# Patient Record
Sex: FEMALE | Race: WHITE
[De-identification: names, ages, dates, MRNs, and addresses within clinical notes are randomized per-mention and may not be internally consistent; named-entity substitution may affect disease eponyms.]

---

## 2019-03-08 ENCOUNTER — HOSPITAL ENCOUNTER (INPATIENT)
Dept: HOSPITAL 5 - ED | Age: 29
LOS: 1 days | Discharge: HOME | DRG: 871 | End: 2019-03-09
Attending: INTERNAL MEDICINE | Admitting: INTERNAL MEDICINE
Payer: SELF-PAY

## 2019-03-08 DIAGNOSIS — J18.1: ICD-10-CM

## 2019-03-08 DIAGNOSIS — A41.9: Primary | ICD-10-CM

## 2019-03-08 LAB
BASOPHILS # (AUTO): 0 K/MM3 (ref 0–0.1)
BASOPHILS NFR BLD AUTO: 0.2 % (ref 0–1.8)
BILIRUB UR QL STRIP: (no result)
BLOOD UR QL VISUAL: (no result)
BUN SERPL-MCNC: 7 MG/DL (ref 7–17)
BUN/CREAT SERPL: 12 %
CALCIUM SERPL-MCNC: 9.1 MG/DL (ref 8.4–10.2)
EOSINOPHIL # BLD AUTO: 0.1 K/MM3 (ref 0–0.4)
EOSINOPHIL NFR BLD AUTO: 0.3 % (ref 0–4.3)
HCT VFR BLD CALC: 39.9 % (ref 30.3–42.9)
HEMOLYSIS INDEX: 8
HGB BLD-MCNC: 13.6 GM/DL (ref 10.1–14.3)
INR PPP: 1.05 (ref 0.87–1.13)
LYMPHOCYTES # BLD AUTO: 0.8 K/MM3 (ref 1.2–5.4)
LYMPHOCYTES NFR BLD AUTO: 4.3 % (ref 13.4–35)
MCHC RBC AUTO-ENTMCNC: 34 % (ref 30–34)
MCV RBC AUTO: 96 FL (ref 79–97)
MONOCYTES # (AUTO): 1.1 K/MM3 (ref 0–0.8)
MONOCYTES % (AUTO): 6 % (ref 0–7.3)
PH UR STRIP: 7 [PH] (ref 5–7)
PLATELET # BLD: 254 K/MM3 (ref 140–440)
PROT UR STRIP-MCNC: (no result) MG/DL
RBC # BLD AUTO: 4.14 M/MM3 (ref 3.65–5.03)
RBC #/AREA URNS HPF: 4 /HPF (ref 0–6)
UROBILINOGEN UR-MCNC: < 2 MG/DL (ref ?–2)
WBC #/AREA URNS HPF: 3 /HPF (ref 0–6)

## 2019-03-08 PROCEDURE — 82550 ASSAY OF CK (CPK): CPT

## 2019-03-08 PROCEDURE — 84703 CHORIONIC GONADOTROPIN ASSAY: CPT

## 2019-03-08 PROCEDURE — 71275 CT ANGIOGRAPHY CHEST: CPT

## 2019-03-08 PROCEDURE — 85025 COMPLETE CBC W/AUTO DIFF WBC: CPT

## 2019-03-08 PROCEDURE — 85610 PROTHROMBIN TIME: CPT

## 2019-03-08 PROCEDURE — 84484 ASSAY OF TROPONIN QUANT: CPT

## 2019-03-08 PROCEDURE — 87806 HIV AG W/HIV1&2 ANTB W/OPTIC: CPT

## 2019-03-08 PROCEDURE — 96361 HYDRATE IV INFUSION ADD-ON: CPT

## 2019-03-08 PROCEDURE — 93010 ELECTROCARDIOGRAM REPORT: CPT

## 2019-03-08 PROCEDURE — 82805 BLOOD GASES W/O2 SATURATION: CPT

## 2019-03-08 PROCEDURE — 87040 BLOOD CULTURE FOR BACTERIA: CPT

## 2019-03-08 PROCEDURE — 80048 BASIC METABOLIC PNL TOTAL CA: CPT

## 2019-03-08 PROCEDURE — 87400 INFLUENZA A/B EACH AG IA: CPT

## 2019-03-08 PROCEDURE — 96375 TX/PRO/DX INJ NEW DRUG ADDON: CPT

## 2019-03-08 PROCEDURE — 71046 X-RAY EXAM CHEST 2 VIEWS: CPT

## 2019-03-08 PROCEDURE — 36415 COLL VENOUS BLD VENIPUNCTURE: CPT

## 2019-03-08 PROCEDURE — 96374 THER/PROPH/DIAG INJ IV PUSH: CPT

## 2019-03-08 PROCEDURE — 81001 URINALYSIS AUTO W/SCOPE: CPT

## 2019-03-08 PROCEDURE — 93005 ELECTROCARDIOGRAM TRACING: CPT

## 2019-03-08 PROCEDURE — 87086 URINE CULTURE/COLONY COUNT: CPT

## 2019-03-08 PROCEDURE — 82140 ASSAY OF AMMONIA: CPT

## 2019-03-08 RX ADMIN — Medication SCH ML: at 23:21

## 2019-03-08 RX ADMIN — SODIUM CHLORIDE SCH MLS/HR: 0.45 INJECTION, SOLUTION INTRAVENOUS at 16:01

## 2019-03-08 NOTE — CAT SCAN REPORT
PROCEDURE: CT ANGIO CHEST 

 

TECHNIQUE: Computerized tomographic angiography of the chest was performed after the IV injection of 
iodinated nonionic contrast including image processing.  The image data was postprocessed using 2-dim
ensional multiplanar reformatted (MPR) and 3-dimensional (MIP and/or volume rendered) techniques. Aut
omated exposure control, adjustment of mA and/or kV according to patient size, or iterative reconstru
ction dose optimization techniques were utilized. Coronal and sagittal reconstructed imaging provided
. 

 

CT DOSE LENGTH PRODUCT:  350.32 mGy-cm. 

 

HISTORY: pleurisy, shortness of breath 

 

COMPARISONS: None currently available. 

 

FINDINGS: 

Moderate irregular consolidation in the medial left lower lobe. No pneumothorax. No effusion. Right l
patrice is clear. No endobronchial lesions. 

 

Main pulmonary artery is unremarkable. No pulmonary embolism. 

No aneurysm. No dissection. Major branch arteries are within normal limits. No significant atheroscle
rotic disease. 

Cardiac silhouette is within normal limits. No pericardial effusion. No obvious coronary artery disea
se. 

 

There is no axillary adenopathy.  

There is no hilar or mediastinal mass or adenopathy.  

Limited images of the thyroid gland are unremarkable. 

Limited images of the esophagus are unremarkable. 

 

Bones: 

No suspicious osseous lesions on this limited examination of the skeleton. Metastatic disease better 
evaluated with bone scan.  

 

IMPRESSION: 

*  Left lower lobe pneumonia. 

*  No pulmonary embolus. No aortic aneurysm. No dissection. 

 

This document is electronically signed by Devaughn Kennedy MD., March 8 2019 11:05:18 AM ET

## 2019-03-08 NOTE — XRAY REPORT
PROCEDURE: XR CHEST ROUTINE 2V 

 

TECHNIQUE:  PA and lateral chest radiographs 

 

HISTORY: Chest pain 

 

COMPARISONS: None  

 

FINDINGS: 

 

No mediastinal shift.  Cardiac silhouette is not enlarged.  No pneumothorax, effusion, or focal pulmo
nary opacity. No acute skeletal finding. 

 

IMPRESSION: 

 

No focal pulmonary opacity. 

  

 

This document is electronically signed by Trevor Rodriguez MD., March 8 2019 07:17:41 AM ET

## 2019-03-08 NOTE — EMERGENCY DEPARTMENT REPORT
HPI





- General


Chief Complaint: Chest Pain


Time Seen by Provider: 19 07:56





- HPI


HPI: 





Room 19





The patient is a 28-year-old female presenting with a chief complaint of chest 

pain shortness of breath.  The patient has had a cough has been productive since

yesterday.  This evening at midnight the patient felt left-sided chest pain, 

pleurisy and shortness of breath.  Patient had a subjective fever.  Patient 

describes her chest pain is sharp and constant in nature.  The patient currently

gives her pain a score of 8/10








Location: Chest, lungs


Duration: [See above]


Quality: Sharp


Severity:8/10


Modifying factors: [see above]


Context: [see above]


Mode of transportation: [not driving]





ED Past Medical Hx





- Past Medical History


Previous Medical History?: No





- Surgical History


Past Surgical History?: No





- Family History


Family history: no significant





- Social History


Smoking Status: Never Smoker


Substance Use Type: None (denies illicit drug use), Alcohol (occ)





- Medications


Home Medications: 


                                Home Medications











 Medication  Instructions  Recorded  Confirmed  Last Taken  Type


 


Ibuprofen [Motrin 600 MG tab] 600 mg PO Q6HR #30 tablet 12/28/15  Unknown Rx


 


oxyCODONE /ACETAMINOPHEN [Percocet 1 tab PO Q4H PRN #30 tablet 12/28/15  Unknown

 Rx





5/325 mg]     














ED Review of Systems


ROS: 


Stated complaint: CHEST PAIN FEVER


Other details as noted in HPI





Constitutional: fever


Eyes: denies: eye pain


ENT: denies: throat pain


Respiratory: cough, shortness of breath


Cardiovascular: chest pain


Endocrine: no symptoms reported


Gastrointestinal: nausea.  denies: vomiting


Genitourinary: denies: dysuria


Musculoskeletal: myalgia


Neurological: denies: headache





Physical Exam





- Physical Exam


Vital Signs: 


                                   Vital Signs











  19





  05:26 05:55 06:40


 


Temperature 98.7 F  99.2 F


 


Pulse Rate 118 H  120 H


 


Respiratory 20 20 20





Rate   


 


Blood Pressure 113/70  





[Left]   


 


O2 Sat by Pulse 99 99 97





Oximetry   














  19





  07:02


 


Temperature 


 


Pulse Rate 


 


Respiratory 





Rate 


 


Blood Pressure 106/59





[Left] 


 


O2 Sat by Pulse 





Oximetry 











Physical Exam: 





GENERAL: The patient is well-developed well-nourished female lying on stretcher 

not appearing to be in acute distress. []


HEENT: Normocephalic.  Atraumatic.  Extraocular motions are intact.  Patient has

 moist mucous membranes.


NECK: Supple.  Trachea midline


CHEST/LUNGS: Clear to auscultation.  There is no respiratory distress noted.


HEART/CARDIOVASCULAR: Regular.  There is tachycardia.  There is no gallop rub or

 murmur.


ABDOMEN: Abdomen is soft, nontender.  Patient has normal bowel sounds.  There is

 no abdominal distention.


SKIN: There is no rash.  There is no edema.  There is no diaphoresis.


NEURO: The patient is awake, alert, and oriented.  The patient is cooperative. 

The patient has normal speech


MUSCULOSKELETAL: There is no evidence of acute injury.





ED Course


                                   Vital Signs











  19





  05:26 05:55 06:40


 


Temperature 98.7 F  99.2 F


 


Pulse Rate 118 H  120 H


 


Respiratory 20 20 20





Rate   


 


Blood Pressure 113/70  





[Left]   


 


O2 Sat by Pulse 99 99 97





Oximetry   














  19





  07:02


 


Temperature 


 


Pulse Rate 


 


Respiratory 





Rate 


 


Blood Pressure 106/59





[Left] 


 


O2 Sat by Pulse 





Oximetry 














- Reevaluation(s)


Reevaluation #1: 





19 13:22


Patient remains tachycardic despite 3 L normal saline.  Will admit to the 

hospital





ED Medical Decision Making





- Lab Data


Result diagrams: 


                                 19 05:44





                                 19 05:44





                                Laboratory Tests











  19





  05:44 05:44 05:44


 


WBC  18.5 H  


 


RBC  4.14  


 


Hgb  13.6  


 


Hct  39.9  


 


MCV  96  


 


MCH  33 H  


 


MCHC  34  


 


RDW  13.2  


 


Plt Count  254  


 


Lymph % (Auto)  4.3 L  


 


Mono % (Auto)  6.0  


 


Eos % (Auto)  0.3  


 


Baso % (Auto)  0.2  


 


Lymph #  0.8 L  


 


Mono #  1.1 H  


 


Eos #  0.1  


 


Baso #  0.0  


 


Seg Neutrophils %  89.2 H  


 


Seg Neutrophils #  16.4 H  


 


PT   


 


INR   


 


VBG pH   


 


Sodium   139 


 


Potassium   4.1 


 


Chloride   103.6 


 


Carbon Dioxide   22 


 


Anion Gap   18 


 


BUN   7 


 


Creatinine   0.6 L 


 


Estimated GFR   > 60 


 


BUN/Creatinine Ratio   12 


 


Glucose   116 H 


 


Lactic Acid   


 


Calcium   9.1 


 


Total Creatine Kinase   


 


Troponin T   < 0.010 


 


HCG, Qual    Negative


 


Urine Color   


 


Urine Turbidity   


 


Urine pH   


 


Ur Specific Gravity   


 


Urine Protein   


 


Urine Glucose (UA)   


 


Urine Ketones   


 


Urine Blood   


 


Urine Nitrite   


 


Urine Bilirubin   


 


Urine Urobilinogen   


 


Ur Leukocyte Esterase   


 


Urine WBC (Auto)   


 


Urine RBC (Auto)   


 


U Epithel Cells (Auto)   


 


Influenza A (Rapid)   


 


Influenza B (Rapid)   














  19





  05:44 06:45 06:45


 


WBC   


 


RBC   


 


Hgb   


 


Hct   


 


MCV   


 


MCH   


 


MCHC   


 


RDW   


 


Plt Count   


 


Lymph % (Auto)   


 


Mono % (Auto)   


 


Eos % (Auto)   


 


Baso % (Auto)   


 


Lymph #   


 


Mono #   


 


Eos #   


 


Baso #   


 


Seg Neutrophils %   


 


Seg Neutrophils #   


 


PT   14.4 


 


INR   1.05 


 


VBG pH   


 


Sodium   


 


Potassium   


 


Chloride   


 


Carbon Dioxide   


 


Anion Gap   


 


BUN   


 


Creatinine   


 


Estimated GFR   


 


BUN/Creatinine Ratio   


 


Glucose   


 


Lactic Acid    1.10


 


Calcium   


 


Total Creatine Kinase  77  


 


Troponin T   


 


HCG, Qual   


 


Urine Color   


 


Urine Turbidity   


 


Urine pH   


 


Ur Specific Gravity   


 


Urine Protein   


 


Urine Glucose (UA)   


 


Urine Ketones   


 


Urine Blood   


 


Urine Nitrite   


 


Urine Bilirubin   


 


Urine Urobilinogen   


 


Ur Leukocyte Esterase   


 


Urine WBC (Auto)   


 


Urine RBC (Auto)   


 


U Epithel Cells (Auto)   


 


Influenza A (Rapid)   


 


Influenza B (Rapid)   














  19





  06:45 08:23 08:29


 


WBC   


 


RBC   


 


Hgb   


 


Hct   


 


MCV   


 


MCH   


 


MCHC   


 


RDW   


 


Plt Count   


 


Lymph % (Auto)   


 


Mono % (Auto)   


 


Eos % (Auto)   


 


Baso % (Auto)   


 


Lymph #   


 


Mono #   


 


Eos #   


 


Baso #   


 


Seg Neutrophils %   


 


Seg Neutrophils #   


 


PT   


 


INR   


 


VBG pH  7.369  


 


Sodium   


 


Potassium   


 


Chloride   


 


Carbon Dioxide   


 


Anion Gap   


 


BUN   


 


Creatinine   


 


Estimated GFR   


 


BUN/Creatinine Ratio   


 


Glucose   


 


Lactic Acid   


 


Calcium   


 


Total Creatine Kinase   


 


Troponin T    < 0.010


 


HCG, Qual   


 


Urine Color   


 


Urine Turbidity   


 


Urine pH   


 


Ur Specific Gravity   


 


Urine Protein   


 


Urine Glucose (UA)   


 


Urine Ketones   


 


Urine Blood   


 


Urine Nitrite   


 


Urine Bilirubin   


 


Urine Urobilinogen   


 


Ur Leukocyte Esterase   


 


Urine WBC (Auto)   


 


Urine RBC (Auto)   


 


U Epithel Cells (Auto)   


 


Influenza A (Rapid)   Negative 


 


Influenza B (Rapid)   Negative 














  19





  09:50 11:35 Unknown


 


WBC   


 


RBC   


 


Hgb   


 


Hct   


 


MCV   


 


MCH   


 


MCHC   


 


RDW   


 


Plt Count   


 


Lymph % (Auto)   


 


Mono % (Auto)   


 


Eos % (Auto)   


 


Baso % (Auto)   


 


Lymph #   


 


Mono #   


 


Eos #   


 


Baso #   


 


Seg Neutrophils %   


 


Seg Neutrophils #   


 


PT   


 


INR   


 


VBG pH   


 


Sodium   


 


Potassium   


 


Chloride   


 


Carbon Dioxide   


 


Anion Gap   


 


BUN   


 


Creatinine   


 


Estimated GFR   


 


BUN/Creatinine Ratio   


 


Glucose   


 


Lactic Acid  0.70  


 


Calcium   


 


Total Creatine Kinase   


 


Troponin T   < 0.010 


 


HCG, Qual   


 


Urine Color    Straw


 


Urine Turbidity    Clear


 


Urine pH    7.0


 


Ur Specific Gravity    1.004


 


Urine Protein    <15 mg/dl


 


Urine Glucose (UA)    Neg


 


Urine Ketones    Neg


 


Urine Blood    Lg


 


Urine Nitrite    Neg


 


Urine Bilirubin    Neg


 


Urine Urobilinogen    < 2.0


 


Ur Leukocyte Esterase    Neg


 


Urine WBC (Auto)    3.0


 


Urine RBC (Auto)    4.0


 


U Epithel Cells (Auto)    < 1.0


 


Influenza A (Rapid)   


 


Influenza B (Rapid)   














- EKG Data


-: EKG Interpreted by Me


EKG shows normal: sinus rhythm


Rate: tachycardia





- EKG Data


When compared to previous EKG there are: previous EKG unavailable


Interpretation: nonspecific ST-T wave darius (T-wave inversion in lead V3, V4)





- Radiology Data


Radiology results: report reviewed (chest x-ray, CT chest), image reviewed 

(chest x-ray, CT chest)


interpreted by me: 





Chest x-ray-no focal infiltrates, no pneumothorax





[address] [address] 





XRay Report 


Signed 





Patient: VALDES,GRISELDA ELIZABETH M 


R#: [id number] 


: 1990 Acct:[id number] 





Age/Sex: 28 / F ADM Date: 19 





Loc: ED 


Attending Dr: 








Ordering Physician: RUDI PERERA MD 


Date of Service: 19 


Procedure(s): XR chest routine 2V 


Accession Number(s): [id number] 





cc: ED MD TREVER 





Fluoro Time In Minutes: 





PROCEDURE: XR CHEST ROUTINE 2V 





TECHNIQUE: PA and lateral chest radiographs 





HISTORY: Chest pain 





COMPARISONS: None 





FINDINGS: 





No mediastinal shift. Cardiac silhouette is not enlarged. No pneumothorax, 

effusion, or focal 


pulmonary opacity. No acute skeletal finding. 





IMPRESSION: 





No focal pulmonary opacity. 








This document is electronically signed by Trevor Rogers MD., 2019 

07:17:41 AM ET 





Transcribed By: MB 


Dictated By: TREVOR ROGERS MD 


Electronically Authenticated By: TREVOR ROGERS MD 


Signed Date/Time: 19 











DD/DT: 1942 


TD/TT: 1902





[address] [address] 





Cat Scan Report 


Signed 





Patient: VALDES,GRISELDA ELIZABETH M 


R#: [id number] 


: 1990 Acct:[id number] 





Age/Sex: 28 / F ADM Date: 19 





Loc: ED 


Attending Dr: 








Ordering Physician: ADAM SEWELL MD 


Date of Service: 19 


Procedure(s): CT angio chest 


Accession Number(s): [id number] 





cc: ADAM SEWELL MD 








PROCEDURE: CT ANGIO CHEST 





TECHNIQUE: Computerized tomographic angiography of the chest was performed after

 the IV injection 


of iodinated nonionic contrast including image processing. The image data was 

postprocessed using 


2-dimensional multiplanar reformatted (MPR) and 3-dimensional (MIP and/or volume

 rendered) 


techniques. Automated exposure control, adjustment of mA and/or kV according to 

patient size, or 


iterative reconstruction dose optimization techniques were utilized. Coronal and

 sagittal 


reconstructed imaging provided. 





CT DOSE LENGTH PRODUCT: 350.32 mGy-cm. 





HISTORY: pleurisy, shortness of breath 





COMPARISONS: None currently available. 





FINDINGS: 


Moderate irregular consolidation in the medial left lower lobe. No pneumothorax.

 No effusion. Right


lung is clear. No endobronchial lesions. 





Main pulmonary artery is unremarkable. No pulmonary embolism. 


No aneurysm. No dissection. Major branch arteries are within normal limits. No 

significant 


atherosclerotic disease. 


Cardiac silhouette is within normal limits. No pericardial effusion. No obvious 

coronary artery 


disease. 





There is no axillary adenopathy. 


There is no hilar or mediastinal mass or adenopathy. 


Limited images of the thyroid gland are unremarkable. 


Limited images of the esophagus are unremarkable. 





Bones: 


No suspicious osseous lesions on this limited examination of the skeleton. 

Metastatic disease 


better evaluated with bone scan. 





IMPRESSION: 


* Left lower lobe pneumonia. 


* No pulmonary embolus. No aortic aneurysm. No dissection. 





This document is electronically signed by Devaughn Gilliland MD., 2019 11:05:18

 AM ET 





Transcribed By: TYM 


Dictated By: DEVAUGHN GILLILAND MD 


Electronically Authenticated By: DEVAUGHN GILLILAND MD 


Signed Date/Time: 19 1107 











DD/DT: 19 


TD/TT: 19





- Differential Diagnosis


pneumonia, influenza, bronchitis, pleurisy, PE


Critical care attestation.: 


If time is entered above; I have spent that time in minutes in the direct care 

of this critically ill patient, excluding procedure time.








ED Disposition


Clinical Impression: 


 Pneumonia, Shortness of breath, Tachycardia, Leukocytosis





Disposition: DC-09 OP ADMIT IP TO THIS HOSP


Is pt being admited?: Yes


Does the pt Need Aspirin: Yes


Condition: Fair


Instructions:  Bacterial Pneumonia (ED)


Referrals: 


[facility] - [facility]-5 Days


Time of Disposition: 13:23 (hospitalist notified (Dr Appiah))

## 2019-03-08 NOTE — HISTORY AND PHYSICAL REPORT
History of Present Illness


Chief complaint: 





Im sick


History of present illness: 


29 YO Female with No PMH presents to ED for evaluation. Pt states that she has 

experienced productive cough with yellow sputum over the past two days as well 

as shortness of breath with left sided chest discomfort. Chest discomfort 

associated with coughing and shortness of breath. Pt acknowledges fever, and 

feeling weak, and tired. Pt denies palpitations, NVD, Trauma, BRBPR, 

Unintentional weight loss, night sweats, leg swelling, calf pain, prolonged 

travel/immobility, skin rash, or recent ill contacts. Pt transported to [facility] by 

her family for further care. Pt seen and evaluated in ED and found to have LLL 

Pneumonia, Sepsis. Pt admitted to medical floor and initiated on Sepsis and 

Pneumonia protocols. 





Past History


Past Medical History: No medical history (reviewed)


Past Surgical History: No surgical history (reviewed)


Social history: , lives with family


Family history: no significant family history (reviewed)





Medications and Allergies


                                    Allergies











Allergy/AdvReac Type Severity Reaction Status Date / Time


 


Pork/Porcine Containing Allergy  Unknown Verified 12/27/15 18:31





Products     











                                Home Medications











 Medication  Instructions  Recorded  Confirmed  Last Taken  Type


 


No Known Home Medications [No  03/08/19 03/08/19 Unknown History





Reported Home Medications]     











Active Meds: 


Active Medications





Acetaminophen (Tylenol)  650 mg PO Q4H PRN


   PRN Reason: Pain MILD(1-3)/Fever >100.5/HA


Albuterol (Proventil)  2.5 mg IH Q4HRT PRN


   PRN Reason: Shortness Of Breath


Sodium Chloride (Nacl 0.45% 1000 Ml)  1,000 mls @ 100 mls/hr IV AS DIRECT MATTEO


Levofloxacin/Dextrose (Levaquin 750mg/150ml)  750 mg in 150 mls @ 100 mls/hr IV 

Q24HR MATTEO; Protocol


Ibuprofen (Motrin)  600 mg PO Q6H PRN


   PRN Reason: Pain, Mild (1-3)


Ondansetron HCl (Zofran)  4 mg IV Q8H PRN


   PRN Reason: Nausea And Vomiting


Oxycodone/Acetaminophen (Percocet 5/325)  1 tab PO Q4H PRN


   PRN Reason: Moderate Pain


Sodium Chloride (Sodium Chloride Flush Syringe 10 Ml)  10 ml IV BID MATTEO


Sodium Chloride (Sodium Chloride Flush Syringe 10 Ml)  10 ml IV PRN PRN


   PRN Reason: LINE FLUSH











Review of Systems


Constitutional: fever, no weight loss, no weight gain


Ears, nose, mouth and throat: no ear pain, no ear discharge, no tinnitis, no 

nose pain, no nasal congestion


Breasts: no change in shape, no swelling, no mass


Cardiovascular: no chest pain, no orthopnea, no palpitations


Respiratory: cough, cough with sputum, shortness of breath, pleurisy


Gastrointestinal: no abdominal pain, no nausea, no vomiting, no diarrhea


Genitourinary Female: no pelvic pain, no flank pain, no menorrhagia, no dysuria,

no urinary frequency, no urgency


Rectal: no pain, no incontinence, no bleeding


Musculoskeletal: no neck stiffness, no neck pain, no shooting arm pain, no arm 

numbness/tingling, no low back pain, no shooting leg pain


Integumentary: no rash, no pruritis, no redness, no sores, no wounds


Neurological: no transient paralysis, no paralysis, no weakness, no parathesias,

no numbness, no tingling, no seizures


Psychiatric: no anxiety, no memory loss, no change in sleep habits, no sleep 

disturbances, no insomnia, no hypersomnia


Endocrine: no cold intolerance, no heat intolerance, no polyphagia, no excessive

thirst, no polydipsia, no polyuria


Hematologic/Lymphatic: no easy bruising, no easy bleeding


Allergic/Immunologic: no urticaria, no allergic rhinitis, no wheezing





Exam





- Constitutional


Vitals: 


                                        











Temp Pulse Resp BP Pulse Ox


 


 99.1 F   113 H  18   117/81   99 


 


 03/08/19 11:55  03/08/19 13:11  03/08/19 11:55  03/08/19 13:00  03/08/19 13:00











General appearance: Present: mild distress





- EENT


Eyes: Present: PERRL


ENT: hearing intact, clear oral mucosa





- Neck


Neck: Present: supple, normal ROM





- Respiratory


Respiratory effort: normal


Respiratory: left: diminished, rhonchi





- Cardiovascular


Rhythm: other (tachycardia)


Heart Sounds: Present: S1 & S2.  Absent: rub, click





- Extremities


Extremities: pulses symmetrical, No edema


Peripheral Pulses: abnormal (capillary refill greater than 3.5 seconds)





- Abdominal


General gastrointestinal: Present: soft, non-tender, non-distended, normal bowel

sounds


Female genitourinary: Present: normal





- Integumentary


Integumentary: Present: clear, warm, dry





- Musculoskeletal


Musculoskeletal: generalized weakness





- Psychiatric


Psychiatric: appropriate mood/affect, intact judgment & insight





- Neurologic


Neurologic: CNII-XII intact, moves all extremities, no gait normal





Results





- Labs


CBC & Chem 7: 


                                 03/08/19 05:44





                                 03/08/19 05:44


Labs: 


                              Abnormal lab results











  03/08/19 03/08/19 Range/Units





  05:44 05:44 


 


WBC  18.5 H   (4.5-11.0)  K/mm3


 


MCH  33 H   (28-32)  pg


 


Lymph % (Auto)  4.3 L   (13.4-35.0)  %


 


Lymph #  0.8 L   (1.2-5.4)  K/mm3


 


Mono #  1.1 H   (0.0-0.8)  K/mm3


 


Seg Neutrophils %  89.2 H   (40.0-70.0)  %


 


Seg Neutrophils #  16.4 H   (1.8-7.7)  K/mm3


 


Creatinine   0.6 L  (0.7-1.2)  mg/dL


 


Glucose   116 H  ()  mg/dL














Assessment and Plan





- Patient Problems


(1) Sepsis


Current Visit: Yes   Status: Acute   


Qualifiers: 


   Sepsis type: sepsis due to unspecified organism   Qualified Code(s): A41.9 - 

Sepsis, unspecified organism   


Plan to address problem: 


IV antibiotic therapy, IVR resuscitation therapy, serial lactic acid level, 

blood cultures, CBC, CMP, chest x ray, urinalysis, influenza A/B, Rapid HIV








(2) Pneumonia


Current Visit: Yes   Status: Acute   


Qualifiers: 


   Laterality: left   Lung location: lower lobe of lung 


Plan to address problem: 


IV antibiotic therapy, supplemental oxygen, nebulizer therapy, chest x ray, 








(3) DVT prophylaxis


Current Visit: Yes   Status: Acute   


Plan to address problem: 


SCD to BLE while in bed.

## 2019-03-09 VITALS — SYSTOLIC BLOOD PRESSURE: 109 MMHG | DIASTOLIC BLOOD PRESSURE: 75 MMHG

## 2019-03-09 LAB
BASOPHILS # (AUTO): 0 K/MM3 (ref 0–0.1)
BASOPHILS NFR BLD AUTO: 0.3 % (ref 0–1.8)
EOSINOPHIL # BLD AUTO: 0.3 K/MM3 (ref 0–0.4)
EOSINOPHIL NFR BLD AUTO: 2.3 % (ref 0–4.3)
HCT VFR BLD CALC: 33.5 % (ref 30.3–42.9)
HGB BLD-MCNC: 11.3 GM/DL (ref 10.1–14.3)
LYMPHOCYTES # BLD AUTO: 1.9 K/MM3 (ref 1.2–5.4)
LYMPHOCYTES NFR BLD AUTO: 17.2 % (ref 13.4–35)
MCHC RBC AUTO-ENTMCNC: 34 % (ref 30–34)
MCV RBC AUTO: 98 FL (ref 79–97)
MONOCYTES # (AUTO): 1 K/MM3 (ref 0–0.8)
MONOCYTES % (AUTO): 8.6 % (ref 0–7.3)
PLATELET # BLD: 209 K/MM3 (ref 140–440)
RBC # BLD AUTO: 3.43 M/MM3 (ref 3.65–5.03)

## 2019-03-09 RX ADMIN — SODIUM CHLORIDE SCH MLS/HR: 0.45 INJECTION, SOLUTION INTRAVENOUS at 03:47

## 2019-03-09 RX ADMIN — Medication SCH ML: at 09:31

## 2019-03-09 NOTE — DISCHARGE SUMMARY
Providers





- Providers


Date of Admission: 


03/08/19 13:35





Date of discharge: 03/09/19


Attending physician: 


KRISTA JOHNSON





Primary care physician: 


[facility], [place]








Hospitalization


Reason for admission: Cough


Condition: Fair


Pertinent studies: 





CTA chest:


* Left lower lobe pneumonia. * No pulmonary embolus. No aortic aneurysm. No 

dissection. 





CXR - no focal opacity








Hospital course: 


27 YO Female with No PMH presents to ED for evaluation with c/o productive cough

with yellow sputum over the past two days as well as shortness of breath with 

left sided chest discomfort. She also had associated cough and shortness of 

breath, fever, and feeling weak, and tired. Patient transported to [facility] by her 

family for further care. Pt seen and evaluated in ED and found to have LLL 

Pneumonia, Sepsis. Patient was admitted to medical floor and initiated on Sepsis

and Pneumonia protocols. She was initiated on iv abx, remained afebrile, her 

symptom improved, she was then discharged home to complete abx course as outpt 

and to f/u with her PCP.  





Discharge diagnosis:


(1) Sepsis due to PNA


(2) Pneumonia, CAP


(3) Rightsided Chest pain due to PNA


(4) DVT prophylaxis











Disposition: DC-01 TO HOME OR SELFCARE


Time spent for discharge: 34 minutes





Core Measure Documentation





- Palliative Care


Palliative Care/ Comfort Measures: Not Applicable





- Core Measures


Any of the following diagnoses?: none





Exam





- Constitutional


Vitals: 


                                        











Temp Pulse Resp BP Pulse Ox


 


 98.1 F   77   24   97/60   98 


 


 03/09/19 05:13  03/09/19 05:13  03/09/19 05:13  03/09/19 05:13  03/09/19 05:13











General appearance: Present: no acute distress, well-nourished





- EENT


Eyes: Present: PERRL


ENT: hearing intact, clear oral mucosa





- Neck


Neck: Present: supple, normal ROM





- Respiratory


Respiratory effort: normal


Respiratory: bilateral: CTA





- Cardiovascular


Heart Sounds: Present: S1 & S2.  Absent: rub, click





- Extremities


Extremities: pulses symmetrical, No edema


Peripheral Pulses: within normal limits





- Abdominal


General gastrointestinal: Present: soft, non-tender, non-distended, normal bowel

sounds


Female genitourinary: Present: normal





- Integumentary


Integumentary: Present: clear, warm, dry





- Musculoskeletal


Musculoskeletal: gait normal, strength equal bilaterally





- Psychiatric


Psychiatric: appropriate mood/affect, intact judgment & insight





- Neurologic


Neurologic: CNII-XII intact, moves all extremities





Plan


Activity: advance as tolerated


Weight Bearing Status: Weight Bear as Tolerated


Diet: regular


Follow up with: 


[facility] [Primary Care Provider] - 3-5 Days


Forms:  Work/School Release Form


Prescriptions: 


levoFLOXacin [Levaquin] 750 mg PO QDAY #5 tablet

## 2019-07-11 ENCOUNTER — HOSPITAL ENCOUNTER (EMERGENCY)
Dept: HOSPITAL 5 - ED | Age: 29
Discharge: HOME | End: 2019-07-11
Payer: COMMERCIAL

## 2019-07-11 VITALS — SYSTOLIC BLOOD PRESSURE: 107 MMHG | DIASTOLIC BLOOD PRESSURE: 63 MMHG
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
## 2019-07-11 DIAGNOSIS — Z91.018: ICD-10-CM

## 2019-07-11 DIAGNOSIS — R51: ICD-10-CM

## 2019-07-11 DIAGNOSIS — Z79.899: ICD-10-CM

## 2019-07-11 DIAGNOSIS — S39.012A: ICD-10-CM

## 2019-07-11 DIAGNOSIS — S16.1XXA: Primary | ICD-10-CM
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
## 2019-07-11 PROCEDURE — 99284 EMERGENCY DEPT VISIT MOD MDM: CPT

## 2019-07-11 PROCEDURE — 84703 CHORIONIC GONADOTROPIN ASSAY: CPT

## 2019-07-11 PROCEDURE — 72100 X-RAY EXAM L-S SPINE 2/3 VWS: CPT

## 2019-07-11 PROCEDURE — 36415 COLL VENOUS BLD VENIPUNCTURE: CPT

## 2019-07-11 PROCEDURE — 96374 THER/PROPH/DIAG INJ IV PUSH: CPT

## 2019-07-11 PROCEDURE — 70450 CT HEAD/BRAIN W/O DYE: CPT

## 2019-07-11 PROCEDURE — 72040 X-RAY EXAM NECK SPINE 2-3 VW: CPT
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
